# Patient Record
Sex: MALE | Race: BLACK OR AFRICAN AMERICAN | NOT HISPANIC OR LATINO
[De-identification: names, ages, dates, MRNs, and addresses within clinical notes are randomized per-mention and may not be internally consistent; named-entity substitution may affect disease eponyms.]

---

## 2019-07-29 ENCOUNTER — RECORD ABSTRACTING (OUTPATIENT)
Age: 54
End: 2019-07-29

## 2019-07-30 ENCOUNTER — APPOINTMENT (OUTPATIENT)
Dept: CARDIOLOGY | Facility: CLINIC | Age: 54
End: 2019-07-30

## 2019-07-30 ENCOUNTER — APPOINTMENT (OUTPATIENT)
Dept: CARDIOLOGY | Facility: CLINIC | Age: 54
End: 2019-07-30
Payer: COMMERCIAL

## 2019-07-30 ENCOUNTER — NON-APPOINTMENT (OUTPATIENT)
Age: 54
End: 2019-07-30

## 2019-07-30 VITALS
BODY MASS INDEX: 37.38 KG/M2 | HEART RATE: 93 BPM | DIASTOLIC BLOOD PRESSURE: 88 MMHG | WEIGHT: 276 LBS | RESPIRATION RATE: 16 BRPM | HEIGHT: 72 IN | SYSTOLIC BLOOD PRESSURE: 140 MMHG

## 2019-07-30 VITALS — SYSTOLIC BLOOD PRESSURE: 128 MMHG | DIASTOLIC BLOOD PRESSURE: 80 MMHG

## 2019-07-30 PROCEDURE — 93000 ELECTROCARDIOGRAM COMPLETE: CPT

## 2019-07-30 PROCEDURE — 99214 OFFICE O/P EST MOD 30 MIN: CPT

## 2019-07-30 RX ORDER — AMLODIPINE BESYLATE AND BENAZEPRIL HYDROCHLORIDE 5; 10 MG/1; MG/1
5-10 CAPSULE ORAL
Refills: 0 | Status: DISCONTINUED | COMMUNITY
End: 2019-07-30

## 2019-07-30 NOTE — DISCUSSION/SUMMARY
[FreeTextEntry1] : Patient is a 54yo M with HTN, obesity, mild MR here for cardiac follow up. Patient has been doing well without any chest pain or shortness of breath.  Given ASCVD 8.7%, will arrange EST prior to more regular exercise and echo to re-evaluate LVH/MR. \par \par 1. Echo to eval LVH and MR seen on prior\par 2. EST prior to more regular exercise and to evaluate CAD\par 3. Recommend aggressive diet and lifestyle modifications, counselled on weight loss\par 4. Will discuss regular exercise program at follow up as long as testing unremarkable \par 5. Follow up after testing\par 6. Continue current antihypertensives, blood pressure is well controlled \par 7. Consider statin given ASCVD

## 2019-07-30 NOTE — ASSESSMENT
[FreeTextEntry1] : ECG: SR, iRBBB, NSST\par \par  HDL 42 LDL 87 TG 62 (6/2019) \par \par EST 2017:\par 1. Negative exercise stress test for ischemia.\par 2. The patient developed occasional PVCs during exercise.\par 3. The blood pressure response was normal.\par 4. The patient developed knee pain during the stress exam. The symptoms resolved with rest.\par 5. The test was terminated due to fatigue and knee pain.\par 6. The patient's functional capacity was average.\par 7. The Duke Treadmill Score was 8, consistent with low risk.\par \par ECHO 2017:\par 1. Left ventricular ejection fraction, by visual estimation, is 60 to 65%.\par 2. Normal global left ventricular systolic function.\par 3. Normal left ventricular internal cavity size.\par 4. Mild concentric left ventricular hypertrophy.\par 5. Normal right ventricular size and systolic function.\par 6. The left atrium is normal in size and structure.\par 7. The right atrium is normal in size and structure.\par 8. Mild thickening of the anterior and posterior mitral valve leaflets.\par 9. Mild mitral valve regurgitation.\par 10. Normal aortic valve, without any evidence of aortic stenosis or insufficiency.\par 11. Trace pulmonic valve regurgitation.\par 12. There is no evidence of pericardial effusion.\par 13. In comparison to prior studies there is no significant interval change.\par 14. TR jet was inadequate for evaluation of RV/PA pressure.\par 15. Recommend clinical correlation with the above findings.

## 2019-07-30 NOTE — HISTORY OF PRESENT ILLNESS
[FreeTextEntry1] : Patient is a 52yo M with HTN, obesity, mild MR here for cardiac follow up. Last seen in 2017. Patient has been doing well without any chest pain or shortness of breath. Patient denies PND/orthopnea/edema/palpitations/syncope/claudication. 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 \par NO regular exercise but active at work. \par \par ROS: GI and  negative

## 2019-07-30 NOTE — PHYSICAL EXAM
[General Appearance - Well Developed] : well developed [General Appearance - Well Nourished] : well nourished [Normal Conjunctiva] : the conjunctiva exhibited no abnormalities [Normal Jugular Venous V Waves Present] : normal jugular venous V waves present [Normal Oropharynx] : normal oropharynx [] : no respiratory distress [Respiration, Rhythm And Depth] : normal respiratory rhythm and effort [Auscultation Breath Sounds / Voice Sounds] : lungs were clear to auscultation bilaterally [Heart Rate And Rhythm] : heart rate and rhythm were normal [Heart Sounds] : normal S1 and S2 [Murmurs] : no murmurs present [Bowel Sounds] : normal bowel sounds [Abdomen Tenderness] : non-tender [Abdomen Soft] : soft [Abnormal Walk] : normal gait [Nail Clubbing] : no clubbing of the fingernails [Cyanosis, Localized] : no localized cyanosis [Skin Color & Pigmentation] : normal skin color and pigmentation [Oriented To Time, Place, And Person] : oriented to person, place, and time [Affect] : the affect was normal [FreeTextEntry1] : no edema

## 2019-09-17 ENCOUNTER — APPOINTMENT (OUTPATIENT)
Dept: CARDIOLOGY | Facility: CLINIC | Age: 54
End: 2019-09-17

## 2019-10-14 ENCOUNTER — APPOINTMENT (OUTPATIENT)
Dept: CARDIOLOGY | Facility: CLINIC | Age: 54
End: 2019-10-14

## 2019-11-11 ENCOUNTER — APPOINTMENT (OUTPATIENT)
Dept: CARDIOLOGY | Facility: CLINIC | Age: 54
End: 2019-11-11

## 2020-01-06 ENCOUNTER — APPOINTMENT (OUTPATIENT)
Dept: CARDIOLOGY | Facility: CLINIC | Age: 55
End: 2020-01-06

## 2020-05-15 ENCOUNTER — TRANSCRIPTION ENCOUNTER (OUTPATIENT)
Age: 55
End: 2020-05-15

## 2021-04-29 ENCOUNTER — APPOINTMENT (OUTPATIENT)
Dept: CARDIOLOGY | Facility: CLINIC | Age: 56
End: 2021-04-29

## 2021-10-13 ENCOUNTER — NON-APPOINTMENT (OUTPATIENT)
Age: 56
End: 2021-10-13

## 2021-10-13 ENCOUNTER — APPOINTMENT (OUTPATIENT)
Dept: CARDIOLOGY | Facility: CLINIC | Age: 56
End: 2021-10-13
Payer: COMMERCIAL

## 2021-10-13 VITALS
OXYGEN SATURATION: 98 % | HEART RATE: 83 BPM | RESPIRATION RATE: 16 BRPM | SYSTOLIC BLOOD PRESSURE: 142 MMHG | BODY MASS INDEX: 39.01 KG/M2 | HEIGHT: 72 IN | DIASTOLIC BLOOD PRESSURE: 88 MMHG | WEIGHT: 288 LBS

## 2021-10-13 DIAGNOSIS — R60.0 LOCALIZED EDEMA: ICD-10-CM

## 2021-10-13 PROCEDURE — 93000 ELECTROCARDIOGRAM COMPLETE: CPT

## 2021-10-13 PROCEDURE — 99214 OFFICE O/P EST MOD 30 MIN: CPT

## 2021-10-13 NOTE — HISTORY OF PRESENT ILLNESS
[FreeTextEntry1] : Patient is a 56yo M with HTN, obesity, mild MR here for cardiac follow up. Gets some intermittent chest pain. Feels sharp, will resolve fairly quickly. Sometimes feels pain in neck as well. Not exercising as much lately. Gets some dyspnea with activity. Getting sob sooner then he used to. At night when relaxed he will feel some palpitations. Not every day. 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 \par \par \par ROS: GI and  negative

## 2021-10-13 NOTE — DISCUSSION/SUMMARY
[FreeTextEntry1] : Patient is a 54yo M with HTN, obesity, mild MR here for cardiac follow up. \par -Having increased dyspnea lately, also atypical chest pain. Needs cardiac eval/work up\par -BP high but apparently normal at PMD\par -Needs recent BW to review\par -ECG with mild abnormalities, iRBBB/NSST/LAE\par -Mild edema as well, possibly weight/dependent\par \par 1. Echo and 2 day nuclear stress testing to evaluate CP/SOB/abnormal ECG \par 2. Continue to monitor BP, no med changes\par 3. Obtain recent blood work to review\par 4. Venous duplex to eval edema\par 5. Recommend aggressive diet and lifestyle modifications \par 6. Follow up after testing \par 7. Carotid from 2020 at [facility] with minimal plaque

## 2021-10-13 NOTE — ASSESSMENT
[FreeTextEntry1] : ECG: SR, iRBBB, NSST, LAE \par \par  HDL 42 LDL 87 TG 62 (6/2019) \par \par EST 2017:\par 1. Negative exercise stress test for ischemia.\par 2. The patient developed occasional PVCs during exercise.\par 3. The blood pressure response was normal.\par 4. The patient developed knee pain during the stress exam. The symptoms resolved with rest.\par 5. The test was terminated due to fatigue and knee pain.\par 6. The patient's functional capacity was average.\par 7. The Duke Treadmill Score was 8, consistent with low risk.\par \par ECHO 2017:\par 1. Left ventricular ejection fraction, by visual estimation, is 60 to 65%.\par 2. Normal global left ventricular systolic function.\par 3. Normal left ventricular internal cavity size.\par 4. Mild concentric left ventricular hypertrophy.\par 5. Normal RV/LA/RA \par 6. Mild thickening of the anterior and posterior mitral valve leaflets.\par 7. Mild mitral valve regurgitation.\par

## 2021-10-13 NOTE — PHYSICAL EXAM
[General Appearance - Well Developed] : well developed [General Appearance - Well Nourished] : well nourished [Normal Conjunctiva] : the conjunctiva exhibited no abnormalities [Normal Oropharynx] : normal oropharynx [Normal Jugular Venous V Waves Present] : normal jugular venous V waves present [] : no respiratory distress [Respiration, Rhythm And Depth] : normal respiratory rhythm and effort [Auscultation Breath Sounds / Voice Sounds] : lungs were clear to auscultation bilaterally [Heart Rate And Rhythm] : heart rate and rhythm were normal [Heart Sounds] : normal S1 and S2 [Murmurs] : no murmurs present [Bowel Sounds] : normal bowel sounds [Abdomen Soft] : soft [Abdomen Tenderness] : non-tender [Abnormal Walk] : normal gait [Nail Clubbing] : no clubbing of the fingernails [Cyanosis, Localized] : no localized cyanosis [Skin Color & Pigmentation] : normal skin color and pigmentation [Oriented To Time, Place, And Person] : oriented to person, place, and time [Affect] : the affect was normal [FreeTextEntry1] : 1+ pitting edema LE bilaterally

## 2021-10-27 ENCOUNTER — APPOINTMENT (OUTPATIENT)
Dept: CARDIOLOGY | Facility: CLINIC | Age: 56
End: 2021-10-27
Payer: COMMERCIAL

## 2021-10-27 PROCEDURE — 93970 EXTREMITY STUDY: CPT

## 2021-10-27 PROCEDURE — 93306 TTE W/DOPPLER COMPLETE: CPT

## 2021-11-29 ENCOUNTER — APPOINTMENT (OUTPATIENT)
Dept: CARDIOLOGY | Facility: CLINIC | Age: 56
End: 2021-11-29
Payer: COMMERCIAL

## 2021-11-29 PROCEDURE — A9500: CPT

## 2021-11-29 PROCEDURE — 93015 CV STRESS TEST SUPVJ I&R: CPT

## 2021-11-29 PROCEDURE — 78452 HT MUSCLE IMAGE SPECT MULT: CPT

## 2021-11-29 RX ORDER — REGADENOSON 0.08 MG/ML
0.4 INJECTION, SOLUTION INTRAVENOUS
Qty: 4 | Refills: 0 | Status: COMPLETED | OUTPATIENT
Start: 2021-11-29

## 2021-11-29 RX ADMIN — REGADENOSON 0 MG/5ML: 0.08 INJECTION, SOLUTION INTRAVENOUS at 00:00

## 2021-11-30 ENCOUNTER — APPOINTMENT (OUTPATIENT)
Dept: CARDIOLOGY | Facility: CLINIC | Age: 56
End: 2021-11-30

## 2022-01-04 ENCOUNTER — NON-APPOINTMENT (OUTPATIENT)
Age: 57
End: 2022-01-04

## 2022-01-04 ENCOUNTER — APPOINTMENT (OUTPATIENT)
Dept: CARDIOLOGY | Facility: CLINIC | Age: 57
End: 2022-01-04
Payer: COMMERCIAL

## 2022-01-04 VITALS
HEART RATE: 78 BPM | SYSTOLIC BLOOD PRESSURE: 120 MMHG | OXYGEN SATURATION: 97 % | BODY MASS INDEX: 38.87 KG/M2 | RESPIRATION RATE: 16 BRPM | DIASTOLIC BLOOD PRESSURE: 81 MMHG | WEIGHT: 287 LBS | HEIGHT: 72 IN

## 2022-01-04 PROCEDURE — 99214 OFFICE O/P EST MOD 30 MIN: CPT

## 2022-01-04 PROCEDURE — 93000 ELECTROCARDIOGRAM COMPLETE: CPT

## 2022-01-18 RX ORDER — KIT FOR THE PREPARATION OF TECHNETIUM TC99M SESTAMIBI 1 MG/5ML
INJECTION, POWDER, LYOPHILIZED, FOR SOLUTION PARENTERAL
Refills: 0 | Status: COMPLETED | OUTPATIENT
Start: 2022-01-18

## 2022-01-18 RX ADMIN — KIT FOR THE PREPARATION OF TECHNETIUM TC99M SESTAMIBI 0: 1 INJECTION, POWDER, LYOPHILIZED, FOR SOLUTION PARENTERAL at 00:00

## 2022-02-09 NOTE — HISTORY OF PRESENT ILLNESS
[FreeTextEntry1] : Patient is a 56yo M with HTN, obesity, mild MR here for cardiac follow up. Gets some intermittent chest pain. Feels sharp, will resolve fairly quickly. Sometimes feels pain in neck as well. Not exercising as much lately. Gets some dyspnea with activity. Getting sob sooner then he used to. At night when relaxed he will feel some palpitations. Not every day. Patient underwent cardiac testing after last visit. CP has improved and has started exercising more. Feels better. 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 \par \par \par ROS: GI and  negative

## 2022-02-09 NOTE — DISCUSSION/SUMMARY
[FreeTextEntry1] : Patient is a 55yo M with HTN, obesity, mild MR here for cardiac follow up. \par -Having increased dyspnea lately, also atypical chest pain appear all noncardiac given test results\par -Nuclear stress test 12/2021 without ischemia\par -Echo with mild LVH, otherwise unremarkable\par -Needs weight loss, BP better and controlled today \par -venous duplex to eval edema negative for DVT 10/2021 \par \par \par 1. Recommend 30 minutes moderate intensity aerobic activity 5 days per week \par 2. Recommend aggressive diet and lifestyle modifications \par 3. Continue current antihypertensives, blood pressure is well controlled\par 4. Annual follow up, consider repeat echo then to eval chamber dimensions \par 5. Carotid from 2020 at [facility] with minimal plaque\par 6. CAC for further risk strat, call with results

## 2022-02-09 NOTE — ASSESSMENT
[FreeTextEntry1] : ECG: SR, iRBBB, NSST\par \par  HDL 42 LDL 87 TG 62 (6/2019) \par \par ECHO 11/2021:\par 1. Mild LVH, EF 60-65%\par 2. Grade I diastolic dysfunction\par 3. Normal RV/LA/RA \par 4. No clinically significant valvular disease \par \par PHARM  NUCLEAR STRESS TEST 12/2021:\par 1. Negative ischemia/infarct, EF 52%\par 2. Normal HR/BP response \par 3. Mild LVE \par \par EST 2017:\par 1. Negative exercise stress test for ischemia.\par 2. The patient developed occasional PVCs during exercise.\par 3. The blood pressure response was normal.\par 4. The patient developed knee pain during the stress exam. The symptoms resolved with rest.\par 5. The test was terminated due to fatigue and knee pain.\par 6. The patient's functional capacity was average.\par 7. The Duke Treadmill Score was 8, consistent with low risk.\par \par ECHO 2017:\par 1. Left ventricular ejection fraction, by visual estimation, is 60 to 65%.\par 2. Normal global left ventricular systolic function.\par 3. Normal left ventricular internal cavity size.\par 4. Mild concentric left ventricular hypertrophy.\par 5. Normal RV/LA/RA \par 6. Mild thickening of the anterior and posterior mitral valve leaflets.\par 7. Mild mitral valve regurgitation.\par

## 2022-02-10 ENCOUNTER — NON-APPOINTMENT (OUTPATIENT)
Age: 57
End: 2022-02-10

## 2022-11-03 ENCOUNTER — APPOINTMENT (OUTPATIENT)
Dept: CARDIOLOGY | Facility: CLINIC | Age: 57
End: 2022-11-03

## 2022-11-03 ENCOUNTER — NON-APPOINTMENT (OUTPATIENT)
Age: 57
End: 2022-11-03

## 2022-11-03 VITALS
DIASTOLIC BLOOD PRESSURE: 56 MMHG | RESPIRATION RATE: 14 BRPM | WEIGHT: 273 LBS | HEART RATE: 75 BPM | OXYGEN SATURATION: 98 % | SYSTOLIC BLOOD PRESSURE: 108 MMHG | BODY MASS INDEX: 36.98 KG/M2 | HEIGHT: 72 IN

## 2022-11-03 DIAGNOSIS — R94.31 ABNORMAL ELECTROCARDIOGRAM [ECG] [EKG]: ICD-10-CM

## 2022-11-03 DIAGNOSIS — R07.89 OTHER CHEST PAIN: ICD-10-CM

## 2022-11-03 DIAGNOSIS — R06.09 OTHER FORMS OF DYSPNEA: ICD-10-CM

## 2022-11-03 PROCEDURE — 99214 OFFICE O/P EST MOD 30 MIN: CPT | Mod: 25

## 2022-11-03 PROCEDURE — 93000 ELECTROCARDIOGRAM COMPLETE: CPT

## 2022-11-03 NOTE — ASSESSMENT
[FreeTextEntry1] : ECG: SR, iRBBB, NSST\par \par  HDL 42 LDL 87 TG 62 (6/2019) \par \par 2/9/2022: CAC = 1 in LAD. \par \par ECHO 11/2021:\par 1. Mild LVH, EF 60-65%\par 2. Grade I diastolic dysfunction\par 3. Normal RV/LA/RA \par 4. No clinically significant valvular disease \par \par PHARM  NUCLEAR STRESS TEST 12/2021:\par 1. Negative ischemia/infarct, EF 52%\par 2. Normal HR/BP response \par 3. Mild LVE \par \par EST 2017:\par 1. Negative exercise stress test for ischemia.\par 2. The patient developed occasional PVCs during exercise.\par 3. The blood pressure response was normal.\par 4. The patient developed knee pain during the stress exam. The symptoms resolved with rest.\par 5. The test was terminated due to fatigue and knee pain.\par 6. The patient's functional capacity was average.\par 7. The Duke Treadmill Score was 8, consistent with low risk.\par \par ECHO 2017:\par 1. Left ventricular ejection fraction, by visual estimation, is 60 to 65%.\par 2. Normal global left ventricular systolic function.\par 3. Normal left ventricular internal cavity size.\par 4. Mild concentric left ventricular hypertrophy.\par 5. Normal RV/LA/RA \par 6. Mild thickening of the anterior and posterior mitral valve leaflets.\par 7. Mild mitral valve regurgitation.\par

## 2022-11-03 NOTE — DISCUSSION/SUMMARY
[FreeTextEntry1] : Patient is a 56yo M with HTN, obesity, mild MR here for cardiac follow up. \par -BP well controlled \par -Nuclear stress test 12/2021 without ischemia, CAC = 1 1/2022\par -Echo with mild LVH 2021 , otherwise unremarkable\par -Needs weight loss, BP better and controlled today \par -venous duplex to eval edema negative for DVT 10/2021 \par -Carotid from 2020 at [facility] with minimal plaque\par \par 1. Recommend 30 minutes moderate intensity aerobic activity 5 days per week \par 2. Recommend aggressive diet and lifestyle modifications \par 3. Continue current antihypertensives, blood pressure is well controlled\par 4. Annual follow up, consider repeat echo then to eval chamber dimensions \par 5. CV stable at this time, follow up 6 months with echo to re-eval LVH/chamber dimensions now that BP better controlled\par 6. Regular PMD follow up

## 2022-11-03 NOTE — PHYSICAL EXAM
[General Appearance - Well Developed] : well developed [General Appearance - Well Nourished] : well nourished [Normal Conjunctiva] : the conjunctiva exhibited no abnormalities [Normal Oropharynx] : normal oropharynx [Normal Jugular Venous V Waves Present] : normal jugular venous V waves present [] : no respiratory distress [Respiration, Rhythm And Depth] : normal respiratory rhythm and effort [Auscultation Breath Sounds / Voice Sounds] : lungs were clear to auscultation bilaterally [Heart Rate And Rhythm] : heart rate and rhythm were normal [Heart Sounds] : normal S1 and S2 [Murmurs] : no murmurs present [Bowel Sounds] : normal bowel sounds [Abdomen Soft] : soft [Abdomen Tenderness] : non-tender [Abnormal Walk] : normal gait [Nail Clubbing] : no clubbing of the fingernails [Cyanosis, Localized] : no localized cyanosis [FreeTextEntry1] : 1+ pitting edema LE bilaterally [Skin Color & Pigmentation] : normal skin color and pigmentation [Oriented To Time, Place, And Person] : oriented to person, place, and time [Affect] : the affect was normal

## 2022-11-03 NOTE — HISTORY OF PRESENT ILLNESS
[FreeTextEntry1] : Patient is a 58yo M with HTN, obesity, mild MR here for cardiac follow up. Patient has been doing well without any chest pain or shortness of breath. Patient denies PND/orthopnea/edema/palpitations/syncope/claudication. BP at home had been running high but normal here. 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 \par \par \par ROS: GI and  negative

## 2022-11-03 NOTE — ASSESSMENT
[FreeTextEntry1] : ECG: SR, iRBBB, NSST (no change from prior)\par \par  HDL 42 LDL 87 TG 62 (6/2019) \par \par 2/9/2022: CAC = 1 in LAD. \par \par ECHO 11/2021:\par 1. Mild LVH, EF 60-65%\par 2. Grade I diastolic dysfunction\par 3. Normal RV/LA/RA \par 4. No clinically significant valvular disease \par \par PHARM  NUCLEAR STRESS TEST 12/2021:\par 1. Negative ischemia/infarct, EF 52%\par 2. Normal HR/BP response \par 3. Mild LVE \par \par EST 2017:\par 1. Negative exercise stress test for ischemia.\par 2. The patient developed occasional PVCs during exercise.\par 3. The blood pressure response was normal.\par 4. The patient developed knee pain during the stress exam. The symptoms resolved with rest.\par 5. The test was terminated due to fatigue and knee pain.\par 6. The patient's functional capacity was average.\par 7. The Duke Treadmill Score was 8, consistent with low risk.\par \par ECHO 2017:\par 1. Left ventricular ejection fraction, by visual estimation, is 60 to 65%.\par 2. Normal global left ventricular systolic function.\par 3. Normal left ventricular internal cavity size.\par 4. Mild concentric left ventricular hypertrophy.\par 5. Normal RV/LA/RA \par 6. Mild thickening of the anterior and posterior mitral valve leaflets.\par 7. Mild mitral valve regurgitation.\par

## 2022-11-03 NOTE — HISTORY OF PRESENT ILLNESS
[FreeTextEntry1] : Patient is a 56yo M with HTN, obesity, mild MR here for cardiac follow up. Patient has been doing well without any chest pain or shortness of breath. 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 \par \par \par ROS: GI and  negative

## 2023-02-06 ENCOUNTER — EMERGENCY (EMERGENCY)
Facility: HOSPITAL | Age: 58
LOS: 0 days | Discharge: HOME | End: 2023-02-06
Attending: EMERGENCY MEDICINE
Payer: COMMERCIAL

## 2023-02-06 VITALS
HEART RATE: 79 BPM | OXYGEN SATURATION: 99 % | RESPIRATION RATE: 18 BRPM | DIASTOLIC BLOOD PRESSURE: 87 MMHG | SYSTOLIC BLOOD PRESSURE: 131 MMHG | TEMPERATURE: 98 F

## 2023-02-06 VITALS — WEIGHT: 265 LBS | HEIGHT: 71 IN

## 2023-02-06 DIAGNOSIS — I45.10 UNSPECIFIED RIGHT BUNDLE-BRANCH BLOCK: ICD-10-CM

## 2023-02-06 DIAGNOSIS — R00.2 PALPITATIONS: ICD-10-CM

## 2023-02-06 DIAGNOSIS — R79.89 OTHER SPECIFIED ABNORMAL FINDINGS OF BLOOD CHEMISTRY: ICD-10-CM

## 2023-02-06 DIAGNOSIS — E04.1 NONTOXIC SINGLE THYROID NODULE: ICD-10-CM

## 2023-02-06 DIAGNOSIS — I10 ESSENTIAL (PRIMARY) HYPERTENSION: ICD-10-CM

## 2023-02-06 DIAGNOSIS — R94.6 ABNORMAL RESULTS OF THYROID FUNCTION STUDIES: ICD-10-CM

## 2023-02-06 DIAGNOSIS — R07.89 OTHER CHEST PAIN: ICD-10-CM

## 2023-02-06 LAB
ADD ON TEST-SPECIMEN IN LAB: SIGNIFICANT CHANGE UP
ALBUMIN SERPL ELPH-MCNC: 4 G/DL — SIGNIFICANT CHANGE UP (ref 3.3–5)
ALP SERPL-CCNC: 45 U/L — SIGNIFICANT CHANGE UP (ref 40–120)
ALT FLD-CCNC: 38 U/L — SIGNIFICANT CHANGE UP (ref 12–78)
ANION GAP SERPL CALC-SCNC: 5 MMOL/L — SIGNIFICANT CHANGE UP (ref 5–17)
APPEARANCE UR: CLEAR — SIGNIFICANT CHANGE UP
AST SERPL-CCNC: 39 U/L — HIGH (ref 15–37)
BACTERIA # UR AUTO: ABNORMAL
BASOPHILS # BLD AUTO: 0.04 K/UL — SIGNIFICANT CHANGE UP (ref 0–0.2)
BASOPHILS NFR BLD AUTO: 0.4 % — SIGNIFICANT CHANGE UP (ref 0–2)
BILIRUB SERPL-MCNC: 0.6 MG/DL — SIGNIFICANT CHANGE UP (ref 0.2–1.2)
BILIRUB UR-MCNC: NEGATIVE — SIGNIFICANT CHANGE UP
BUN SERPL-MCNC: 15 MG/DL — SIGNIFICANT CHANGE UP (ref 7–23)
CALCIUM SERPL-MCNC: 9.4 MG/DL — SIGNIFICANT CHANGE UP (ref 8.5–10.1)
CHLORIDE SERPL-SCNC: 104 MMOL/L — SIGNIFICANT CHANGE UP (ref 96–108)
CO2 SERPL-SCNC: 30 MMOL/L — SIGNIFICANT CHANGE UP (ref 22–31)
COLOR SPEC: YELLOW — SIGNIFICANT CHANGE UP
CREAT SERPL-MCNC: 1.42 MG/DL — HIGH (ref 0.5–1.3)
DIFF PNL FLD: ABNORMAL
EGFR: 58 ML/MIN/1.73M2 — LOW
EOSINOPHIL # BLD AUTO: 0.05 K/UL — SIGNIFICANT CHANGE UP (ref 0–0.5)
EOSINOPHIL NFR BLD AUTO: 0.5 % — SIGNIFICANT CHANGE UP (ref 0–6)
EPI CELLS # UR: NEGATIVE — SIGNIFICANT CHANGE UP
GLUCOSE SERPL-MCNC: 87 MG/DL — SIGNIFICANT CHANGE UP (ref 70–99)
GLUCOSE UR QL: NEGATIVE — SIGNIFICANT CHANGE UP
GRAN CASTS # UR COMP ASSIST: ABNORMAL /LPF
HCT VFR BLD CALC: 42 % — SIGNIFICANT CHANGE UP (ref 39–50)
HGB BLD-MCNC: 14.6 G/DL — SIGNIFICANT CHANGE UP (ref 13–17)
HYALINE CASTS # UR AUTO: ABNORMAL /LPF
IMM GRANULOCYTES NFR BLD AUTO: 0.3 % — SIGNIFICANT CHANGE UP (ref 0–0.9)
KETONES UR-MCNC: NEGATIVE — SIGNIFICANT CHANGE UP
LEUKOCYTE ESTERASE UR-ACNC: NEGATIVE — SIGNIFICANT CHANGE UP
LYMPHOCYTES # BLD AUTO: 1.61 K/UL — SIGNIFICANT CHANGE UP (ref 1–3.3)
LYMPHOCYTES # BLD AUTO: 15.8 % — SIGNIFICANT CHANGE UP (ref 13–44)
MCHC RBC-ENTMCNC: 32.4 PG — SIGNIFICANT CHANGE UP (ref 27–34)
MCHC RBC-ENTMCNC: 34.8 GM/DL — SIGNIFICANT CHANGE UP (ref 32–36)
MCV RBC AUTO: 93.3 FL — SIGNIFICANT CHANGE UP (ref 80–100)
MONOCYTES # BLD AUTO: 0.56 K/UL — SIGNIFICANT CHANGE UP (ref 0–0.9)
MONOCYTES NFR BLD AUTO: 5.5 % — SIGNIFICANT CHANGE UP (ref 2–14)
NEUTROPHILS # BLD AUTO: 7.88 K/UL — HIGH (ref 1.8–7.4)
NEUTROPHILS NFR BLD AUTO: 77.5 % — HIGH (ref 43–77)
NITRITE UR-MCNC: NEGATIVE — SIGNIFICANT CHANGE UP
PH UR: 5 — SIGNIFICANT CHANGE UP (ref 5–8)
PLATELET # BLD AUTO: 206 K/UL — SIGNIFICANT CHANGE UP (ref 150–400)
POTASSIUM SERPL-MCNC: 4 MMOL/L — SIGNIFICANT CHANGE UP (ref 3.5–5.3)
POTASSIUM SERPL-SCNC: 4 MMOL/L — SIGNIFICANT CHANGE UP (ref 3.5–5.3)
PROT SERPL-MCNC: 7.9 GM/DL — SIGNIFICANT CHANGE UP (ref 6–8.3)
PROT UR-MCNC: 100
RBC # BLD: 4.5 M/UL — SIGNIFICANT CHANGE UP (ref 4.2–5.8)
RBC # FLD: 12.4 % — SIGNIFICANT CHANGE UP (ref 10.3–14.5)
RBC CASTS # UR COMP ASSIST: NEGATIVE /HPF — SIGNIFICANT CHANGE UP (ref 0–4)
SODIUM SERPL-SCNC: 139 MMOL/L — SIGNIFICANT CHANGE UP (ref 135–145)
SP GR SPEC: 1.01 — SIGNIFICANT CHANGE UP (ref 1.01–1.02)
TROPONIN I, HIGH SENSITIVITY RESULT: 29.38 NG/L — SIGNIFICANT CHANGE UP
TSH SERPL-MCNC: 0.12 UU/ML — LOW (ref 0.34–4.82)
UROBILINOGEN FLD QL: NEGATIVE — SIGNIFICANT CHANGE UP
WBC # BLD: 10.17 K/UL — SIGNIFICANT CHANGE UP (ref 3.8–10.5)
WBC # FLD AUTO: 10.17 K/UL — SIGNIFICANT CHANGE UP (ref 3.8–10.5)
WBC UR QL: SIGNIFICANT CHANGE UP /HPF (ref 0–5)

## 2023-02-06 PROCEDURE — 99284 EMERGENCY DEPT VISIT MOD MDM: CPT

## 2023-02-06 PROCEDURE — 80053 COMPREHEN METABOLIC PANEL: CPT

## 2023-02-06 PROCEDURE — 71045 X-RAY EXAM CHEST 1 VIEW: CPT

## 2023-02-06 PROCEDURE — 84484 ASSAY OF TROPONIN QUANT: CPT

## 2023-02-06 PROCEDURE — 84443 ASSAY THYROID STIM HORMONE: CPT

## 2023-02-06 PROCEDURE — 93010 ELECTROCARDIOGRAM REPORT: CPT

## 2023-02-06 PROCEDURE — 99285 EMERGENCY DEPT VISIT HI MDM: CPT | Mod: 25

## 2023-02-06 PROCEDURE — 85025 COMPLETE CBC W/AUTO DIFF WBC: CPT

## 2023-02-06 PROCEDURE — 93005 ELECTROCARDIOGRAM TRACING: CPT

## 2023-02-06 PROCEDURE — 81001 URINALYSIS AUTO W/SCOPE: CPT

## 2023-02-06 PROCEDURE — 71045 X-RAY EXAM CHEST 1 VIEW: CPT | Mod: 26

## 2023-02-06 PROCEDURE — 36415 COLL VENOUS BLD VENIPUNCTURE: CPT

## 2023-02-06 NOTE — ED ADULT TRIAGE NOTE - CHIEF COMPLAINT QUOTE
Pt arrives to ED complaining of intermittent chest "tightness" for two weeks worsening this morning. Hx HTN.

## 2023-02-06 NOTE — ED STATDOCS - PATIENT PORTAL LINK FT
You can access the FollowMyHealth Patient Portal offered by [facility] by registering at the following website: [email or web address]. By joining [employer]’s FollowMyHealth portal, you will also be able to view your health information using other applications (apps) compatible with our system.

## 2023-02-06 NOTE — ED ADULT NURSE NOTE - NSIMPLEMENTINTERV_GEN_ALL_ED
Implemented All Universal Safety Interventions:  Fine to call system. Call bell, personal items and telephone within reach. Instruct patient to call for assistance. Room bathroom lighting operational. Non-slip footwear when patient is off stretcher. Physically safe environment: no spills, clutter or unnecessary equipment. Stretcher in lowest position, wheels locked, appropriate side rails in place.

## 2023-02-06 NOTE — ED STATDOCS - ATTENDING APP SHARED VISIT CONTRIBUTION OF CARE
I personally saw the patient with the RALPH, and completed the key components of the history and physical exam. I then discussed the management plan with the RALPH.

## 2023-02-06 NOTE — ED STATDOCS - PROGRESS NOTE DETAILS
signed Chaya Purcell PA-C Pt seen initially in intake by Dr. Li   57M PMH HTN c/o intermittent chest discomfort and palpitations for a few weeks. pt has appt with card Dr Tracy on 2/9. No hx oc CAD. EKG sinus rhythm 84 with incomplete RBBB. I spoke to Dr Tracy, if ER workup negative will DC pt and he will have office call pt to arrange for outpt Holter monitor.

## 2023-02-06 NOTE — ED STATDOCS - CARE PROVIDER_API CALL
Thang Tracy)  Cardiovascular Disease  [address] [place] [address]  Phone: [phone]  Fax: [phone]  Follow Up Time:

## 2023-02-06 NOTE — ED STATDOCS - CLINICAL SUMMARY MEDICAL DECISION MAKING FREE TEXT BOX
Pt with intermittent palpitations x2 weeks. Will check labs and compare EKG to old. If workup negative, pt to follow up with cardio at his appt in 3 days. Pt with intermittent palpitations x2 weeks. Will check labs and compare EKG to old. If workup negative, pt to follow up with cardio at his appt in 3 days.    signed Chaya Purcell PA-C   Pt with palpitations and chest discomfort. TSH low, pt notes he has had thyroid nodule workup years ago with endocrine including bx but was negative, not on thyroid meds. Slightly elevated creatinine. Otherwise labs NAD. Pt to f/u with his PMD and card on 2/9, has appts with both. pt spoke to card office will go there tomorrow for holter monitor. return precautions given. Pt feeling well at LA, agrees with DC and plan of care.

## 2023-02-06 NOTE — ED STATDOCS - OBJECTIVE STATEMENT
58 y/o male with a PMHx of HTN presents to the ED c/o intermittent chest tightness and palpitations x weeks. Pt had cardiac workup in November, reportedly normal. Pt has an appt with his cardiologist in 3 days. NKDA. No other complaints at this time. Cardio: Dr. Tracy.

## 2023-02-06 NOTE — ED STATDOCS - NS ED ATTENDING STATEMENT MOD
This was a shared visit with the RALPH. I reviewed and verified the documentation and independently performed the documented:

## 2023-02-06 NOTE — ED STATDOCS - NSFOLLOWUPINSTRUCTIONS_ED_ALL_ED_FT
FOLLOW UP WITH DR DINH ON THURSDAY AT YOUR APPOINTMENT. RETURN TO ER FOR ANY WORSENING SYMPTOMS OR NEW CONCERNS.     Heart Palpitations    WHAT YOU NEED TO KNOW:    Heart palpitations are feelings that your heart races, jumps, throbs, or flutters. You may feel extra beats, no beats for a short time, or skipped beats. You may have these feelings in your chest, throat, or neck. They may happen when you are sitting, standing, or lying. Heart palpitations may be frightening, but are usually not caused by a serious problem.     DISCHARGE INSTRUCTIONS:    Call 911 or have someone else call for any of the following:     You have any of the following signs of a heart attack:   Squeezing, pressure, or pain in your chest       and any of the following:   Discomfort or pain in your back, neck, jaw, stomach, or arm       Shortness of breath      Nausea or vomiting      Lightheadedness or a sudden cold sweat      You have any of the following signs of a stroke:   Numbness or drooping on one side of your face       Weakness in an arm or leg      Confusion or difficulty speaking      Dizziness, a severe headache, or vision loss      You faint or lose consciousness.     Return to the emergency department if:     Your palpitations happen more often or get more intense.         Contact your healthcare provider if:     You have new or worsening swelling in your feet or ankles.      You have questions or concerns about your condition or care.    Follow up with your healthcare provider as directed: You may need to follow up with a cardiologist. You may need tests to check for heart problems that cause palpitations. Write down your questions so you remember to ask them during your visits.     Keep a record: Write down when your palpitations start and stop, what you were doing when they started, and your symptoms. Keep track of what you ate or drank within a few hours of your palpitations. Include anything that seemed to help your symptoms, such as lying down or holding your breath. This record will help you and your healthcare provider learn what triggers your palpitations. Bring this record with you to your follow up visits.    Help prevent heart palpitations:     Manage stress and anxiety. Find ways to relax such as listening to music, meditating, or doing yoga. Exercise can also help decrease stress and anxiety. Talk to someone you trust about your stress or anxiety. You can also talk to a therapist.       Get plenty of sleep every night. Ask your healthcare provider how much sleep you need each night.       Do not drink caffeine or alcohol. Caffeine and alcohol can make your palpitations worse. Caffeine is found in soda, coffee, tea, chocolate, and drinks that increase your energy.       Do not smoke. Nicotine and other chemicals in cigarettes and cigars may damage your heart and blood vessels. Ask your healthcare provider for information if you currently smoke and need help to quit. E-cigarettes or smokeless tobacco still contain nicotine. Talk to your healthcare provider before you use these products.       Do not use illegal drugs. Talk to your healthcare provider if you use illegal drugs and want help to quit. FOLLOW UP WITH DR DINH AND YOUR PRIMARY DOCTOR ON THURSDAY AT YOUR APPOINTMENT. RETURN TO ER FOR ANY WORSENING SYMPTOMS OR NEW CONCERNS.     Heart Palpitations    WHAT YOU NEED TO KNOW:    Heart palpitations are feelings that your heart races, jumps, throbs, or flutters. You may feel extra beats, no beats for a short time, or skipped beats. You may have these feelings in your chest, throat, or neck. They may happen when you are sitting, standing, or lying. Heart palpitations may be frightening, but are usually not caused by a serious problem.     DISCHARGE INSTRUCTIONS:    Call 911 or have someone else call for any of the following:     You have any of the following signs of a heart attack:   Squeezing, pressure, or pain in your chest       and any of the following:   Discomfort or pain in your back, neck, jaw, stomach, or arm       Shortness of breath      Nausea or vomiting      Lightheadedness or a sudden cold sweat      You have any of the following signs of a stroke:   Numbness or drooping on one side of your face       Weakness in an arm or leg      Confusion or difficulty speaking      Dizziness, a severe headache, or vision loss      You faint or lose consciousness.     Return to the emergency department if:     Your palpitations happen more often or get more intense.         Contact your healthcare provider if:     You have new or worsening swelling in your feet or ankles.      You have questions or concerns about your condition or care.    Follow up with your healthcare provider as directed: You may need to follow up with a cardiologist. You may need tests to check for heart problems that cause palpitations. Write down your questions so you remember to ask them during your visits.     Keep a record: Write down when your palpitations start and stop, what you were doing when they started, and your symptoms. Keep track of what you ate or drank within a few hours of your palpitations. Include anything that seemed to help your symptoms, such as lying down or holding your breath. This record will help you and your healthcare provider learn what triggers your palpitations. Bring this record with you to your follow up visits.    Help prevent heart palpitations:     Manage stress and anxiety. Find ways to relax such as listening to music, meditating, or doing yoga. Exercise can also help decrease stress and anxiety. Talk to someone you trust about your stress or anxiety. You can also talk to a therapist.       Get plenty of sleep every night. Ask your healthcare provider how much sleep you need each night.       Do not drink caffeine or alcohol. Caffeine and alcohol can make your palpitations worse. Caffeine is found in soda, coffee, tea, chocolate, and drinks that increase your energy.       Do not smoke. Nicotine and other chemicals in cigarettes and cigars may damage your heart and blood vessels. Ask your healthcare provider for information if you currently smoke and need help to quit. E-cigarettes or smokeless tobacco still contain nicotine. Talk to your healthcare provider before you use these products.       Do not use illegal drugs. Talk to your healthcare provider if you use illegal drugs and want help to quit.

## 2023-02-07 ENCOUNTER — APPOINTMENT (OUTPATIENT)
Dept: CARDIOLOGY | Facility: CLINIC | Age: 58
End: 2023-02-07
Payer: COMMERCIAL

## 2023-02-07 PROCEDURE — ZZZZZ: CPT

## 2023-02-08 ENCOUNTER — TRANSCRIPTION ENCOUNTER (OUTPATIENT)
Age: 58
End: 2023-02-08

## 2023-02-09 ENCOUNTER — APPOINTMENT (OUTPATIENT)
Dept: CARDIOLOGY | Facility: CLINIC | Age: 58
End: 2023-02-09
Payer: COMMERCIAL

## 2023-02-09 VITALS
WEIGHT: 268 LBS | DIASTOLIC BLOOD PRESSURE: 80 MMHG | HEART RATE: 71 BPM | RESPIRATION RATE: 15 BRPM | SYSTOLIC BLOOD PRESSURE: 120 MMHG | HEIGHT: 72 IN | OXYGEN SATURATION: 98 % | BODY MASS INDEX: 36.3 KG/M2

## 2023-02-09 PROCEDURE — 99214 OFFICE O/P EST MOD 30 MIN: CPT | Mod: 25

## 2023-02-09 PROCEDURE — 93000 ELECTROCARDIOGRAM COMPLETE: CPT

## 2023-02-09 RX ORDER — TESTOSTERONE CYPIONATE 100 MG/ML
100 INJECTION, SOLUTION INTRAMUSCULAR
Refills: 0 | Status: ACTIVE | COMMUNITY

## 2023-02-09 NOTE — DISCUSSION/SUMMARY
[EKG obtained to assist in diagnosis and management of assessed problem(s)] : EKG obtained to assist in diagnosis and management of assessed problem(s) [FreeTextEntry1] : Patient is a 58yo M with HTN, obesity, mild MR here for cardiac follow up and eval palps/CP. \par \par -Holter with unifocal PVCs\par -TSH mildly low in ED recently (0.12), scheduled to have repeated and sonogram of thyroid nodule\par -symptoms could be from hyperthyroidism although TSH only minimally low. May need endocrine eval\par -Will start low dose beta blocker, arrange echo and follow up 1 month\par \par -Nuclear stress test 12/2021 without ischemia, CAC = 1 1/2022\par -Echo with mild LVH 2021 , otherwise unremarkable\par -Needs weight loss, BP better and controlled today \par -venous duplex to eval edema negative for DVT 10/2021 \par -Carotid from 2020 at [facility] with minimal plaque\par \par 1. Metoprolol ER 25mg daily, echo and follow up 1 month\par 2. Evaluation of hyperthyroid per primary, may need endocrine eval

## 2023-02-09 NOTE — HISTORY OF PRESENT ILLNESS
[FreeTextEntry1] : Patient is a 58yo M with HTN, obesity, mild MR here for cardiac follow up of recent hospitalization for CP/palps. ECG and trops/BW negative. CAme in for holter and now follow up.  \par \par Starting beginning of this year feeling unwell. FElt more anxious and not himself.  Noted palps and racing intermittently. Would feel like its skipping a beat and irregular. Started feeling some heaviness in chest as well, not exertional . No PND/orthopnea/syncope/edema. 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 \par \par \par ROS: GI and  negative

## 2023-02-09 NOTE — ASSESSMENT
[FreeTextEntry1] : \par \par  HDL 42 LDL 87 TG 62 (6/2019) \par \par 2/9/2022: CAC = 1 in LAD. \par \par HOLTER 2/2023:\par 1. SR\par 2. Moderate PVCs, unifocal, 30 per hour\par \par ECHO 11/2021:\par 1. Mild LVH, EF 60-65%\par 2. Grade I diastolic dysfunction\par 3. Normal RV/LA/RA \par 4. No clinically significant valvular disease \par \par PHARM  NUCLEAR STRESS TEST 12/2021:\par 1. Negative ischemia/infarct, EF 52%\par 2. Normal HR/BP response \par 3. Mild LVE \par \par EST 2017:\par 1. Negative exercise stress test for ischemia.\par 2. The patient developed occasional PVCs during exercise.\par 3. The blood pressure response was normal.\par 4. The patient developed knee pain during the stress exam. The symptoms resolved with rest.\par 5. The test was terminated due to fatigue and knee pain.\par 6. The patient's functional capacity was average.\par 7. The Duke Treadmill Score was 8, consistent with low risk.\par \par ECHO 2017:\par 1. Left ventricular ejection fraction, by visual estimation, is 60 to 65%.\par 2. Normal global left ventricular systolic function.\par 3. Normal left ventricular internal cavity size.\par 4. Mild concentric left ventricular hypertrophy.\par 5. Normal RV/LA/RA \par 6. Mild thickening of the anterior and posterior mitral valve leaflets.\par 7. Mild mitral valve regurgitation.\par

## 2023-02-10 PROBLEM — I10 ESSENTIAL (PRIMARY) HYPERTENSION: Chronic | Status: ACTIVE | Noted: 2023-02-06

## 2023-02-22 ENCOUNTER — NON-APPOINTMENT (OUTPATIENT)
Age: 58
End: 2023-02-22

## 2023-02-23 ENCOUNTER — APPOINTMENT (OUTPATIENT)
Dept: CARDIOLOGY | Facility: CLINIC | Age: 58
End: 2023-02-23
Payer: COMMERCIAL

## 2023-02-23 PROCEDURE — 93306 TTE W/DOPPLER COMPLETE: CPT

## 2023-03-08 ENCOUNTER — APPOINTMENT (OUTPATIENT)
Dept: CARDIOLOGY | Facility: CLINIC | Age: 58
End: 2023-03-08
Payer: COMMERCIAL

## 2023-03-08 VITALS
WEIGHT: 258 LBS | BODY MASS INDEX: 34.95 KG/M2 | DIASTOLIC BLOOD PRESSURE: 76 MMHG | SYSTOLIC BLOOD PRESSURE: 122 MMHG | HEART RATE: 75 BPM | OXYGEN SATURATION: 97 % | HEIGHT: 72 IN | RESPIRATION RATE: 15 BRPM

## 2023-03-08 PROCEDURE — 99214 OFFICE O/P EST MOD 30 MIN: CPT

## 2023-03-08 NOTE — HISTORY OF PRESENT ILLNESS
[FreeTextEntry1] : Patient is a 58yo M with HTN, obesity, mild MR here for cardiac follow up of recent hospitalization for CP/palps. ECG and trops/BW negative. CAme in for holter and now follow up.  \par \par Starting beginning of this year feeling unwell. FElt more anxious and not himself.  Noted palps and racing intermittently. Would feel like its skipping a beat and irregular. Started feeling some heaviness in chest as well, not exertional . No PND/orthopnea/syncope/edema. \par \par Started on metoprolol, echo and holter done. Metoprolol increased to 50mg daily and feeling better with higher dose. HAs not seen endocrine. 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 \par \par \par ROS: GI and  negative

## 2023-03-08 NOTE — DISCUSSION/SUMMARY
[FreeTextEntry1] : Patient is a 56yo M with HTN, obesity, mild MR here for cardiac follow up and eval palps/CP. \par \par -Holter with unifocal PVCs 2/2023, 30 / hour, not high burden\par -TSH mildly low in ED recently (0.12), scheduled to have repeated and sonogram of thyroid nodule\par -symptoms could be from hyperthyroidism although TSH only minimally low. May need endocrine eval\par \par \par -Nuclear stress test 12/2021 without ischemia, CAC = 1 1/2022\par -Echo with mild LVH 2/2023 , otherwise unremarkable and no change from prior\par -Needs weight loss, BP better and controlled today \par -venous duplex to eval edema negative for DVT 10/2021 \par -Carotid from 2020 at [facility] with minimal plaque\par \par 1. Continue Metoprolol ER 50 mg daily\par 2. Evaluation of hyperthyroid per primary, may need endocrine eval\par 3. BP controlled , no med changes . Concerned about stopping an OTC supplement he thinks has helped BP, will stop and let me know if changes in BP at home. Do not expect any change\par 4. ASCVD 10.4%, but very low CAC and will hold off statin at this time\par 5. Follow up 6 months \par 6. PMD and endocrine follow up planned

## 2023-03-08 NOTE — ASSESSMENT
[FreeTextEntry1] : \par \par  HDL 36   (2021) \par  HDL 42 LDL 87 TG 62 (6/2019) \par \par 2/9/2022: CAC = 1 in LAD. \par \par ECHO 2/2023:\par 1. Mild LVH, EF 55-60%\par 2. Grade I diastolic dysfunction \par 3. Normal RV/LA/RA \par 4. Mild MR\par \par HOLTER 2/2023:\par 1. SR\par 2. Moderate PVCs, unifocal, 30 per hour\par \par ECHO 11/2021:\par 1. Mild LVH, EF 60-65%\par 2. Grade I diastolic dysfunction\par 3. Normal RV/LA/RA \par 4. No clinically significant valvular disease \par \par PHARM  NUCLEAR STRESS TEST 12/2021:\par 1. Negative ischemia/infarct, EF 52%\par 2. Normal HR/BP response \par 3. Mild LVE \par \par EST 2017:\par 1. Negative exercise stress test for ischemia.\par 2. The patient developed occasional PVCs during exercise.\par 3. The blood pressure response was normal.\par 4. The patient developed knee pain during the stress exam. The symptoms resolved with rest.\par 5. The test was terminated due to fatigue and knee pain.\par 6. The patient's functional capacity was average.\par 7. The Duke Treadmill Score was 8, consistent with low risk.\par \par ECHO 2017:\par 1. Left ventricular ejection fraction, by visual estimation, is 60 to 65%.\par 2. Normal global left ventricular systolic function.\par 3. Normal left ventricular internal cavity size.\par 4. Mild concentric left ventricular hypertrophy.\par 5. Normal RV/LA/RA \par 6. Mild thickening of the anterior and posterior mitral valve leaflets.\par 7. Mild mitral valve regurgitation.\par

## 2023-04-18 ENCOUNTER — APPOINTMENT (OUTPATIENT)
Dept: CARDIOLOGY | Facility: CLINIC | Age: 58
End: 2023-04-18

## 2023-08-07 RX ORDER — METOPROLOL SUCCINATE 50 MG/1
50 TABLET, EXTENDED RELEASE ORAL DAILY
Qty: 90 | Refills: 1 | Status: ACTIVE | COMMUNITY
Start: 2023-02-09 | End: 1900-01-01

## 2023-09-07 ENCOUNTER — APPOINTMENT (OUTPATIENT)
Dept: CARDIOLOGY | Facility: CLINIC | Age: 58
End: 2023-09-07
Payer: COMMERCIAL

## 2023-09-07 VITALS
BODY MASS INDEX: 31.42 KG/M2 | WEIGHT: 232 LBS | RESPIRATION RATE: 15 BRPM | HEIGHT: 72 IN | OXYGEN SATURATION: 98 % | HEART RATE: 75 BPM | DIASTOLIC BLOOD PRESSURE: 80 MMHG | SYSTOLIC BLOOD PRESSURE: 110 MMHG

## 2023-09-07 DIAGNOSIS — I34.0 NONRHEUMATIC MITRAL (VALVE) INSUFFICIENCY: ICD-10-CM

## 2023-09-07 PROCEDURE — 93000 ELECTROCARDIOGRAM COMPLETE: CPT

## 2023-09-07 PROCEDURE — 99214 OFFICE O/P EST MOD 30 MIN: CPT | Mod: 25

## 2023-09-07 RX ORDER — ESZOPICLONE 1 MG/1
1 TABLET, FILM COATED ORAL
Refills: 0 | Status: ACTIVE | COMMUNITY

## 2023-09-07 RX ORDER — METHIMAZOLE 5 MG/1
5 TABLET ORAL DAILY
Refills: 0 | Status: ACTIVE | COMMUNITY

## 2023-09-07 NOTE — DISCUSSION/SUMMARY
[FreeTextEntry1] : Patient is a 56yo M with HTN, obesity, mild MR here for cardiac follow up  -Holter with unifocal PVCs 2/2023, 30 / hour, low  burden  -Nuclear stress test 12/2021 without ischemia, CAC = 1 1/2022 -Echo with mild LVH 2/2023 , otherwise unremarkable and no change from prior -venous duplex to eval edema negative for DVT 10/2021  -Carotid from 2020 at [facility] with minimal plaque  --Follows with endocrine now for hyperthyroidism.  -CV stable at this time -STruggles with insomnia and brain fog   1. Continue Metoprolol ER 50 mg daily 2. Endocrine follow up  3. Continue current antihypertensives, blood pressure is well controlled  4. ASCVD 10.4%, but very low CAC and will hold off statin at this time 5. CV stable, continue aggressive risk factor modification  6. Takes OTC supplement Carditone, plans to stop. THinks BP has gone up with stopping in past. Will monitor at home and contact me if BP starts to increase. No clear reason it should effect his BP, will try QOD for 1 week then stop 7. Follow up 6 months  [EKG obtained to assist in diagnosis and management of assessed problem(s)] : EKG obtained to assist in diagnosis and management of assessed problem(s)

## 2023-09-07 NOTE — HISTORY OF PRESENT ILLNESS
[FreeTextEntry1] : Patient is a 58yo M with HTN, obesity, mild MR here for cardiac follow up of recent hospitalization for CP/palps. ECG and trops/BW negative. CAme in for holter and now follow up.    Starting beginning of 2023r feeling unwell. FElt more anxious and not himself.  Noted palps and racing intermittently.  Tolerating metoprolol well since last OV and helped his BP/palps. Noted to have new hyperthyroidism at that time as well. Seen by endocrine. Ultimately put on methimazole. Still not feeling well, struggling with insomnia. No CP/SOB. Patient denies PND/orthopnea/edema/palpitations/syncope/claudication. Still with some "brain fog". Has lost weight.   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    ROS: GI and  negative

## 2023-10-17 NOTE — ED ADULT NURSE NOTE - NSFALLRSKOUTCOME_ED_ALL_ED
Administered By (Optional): Minh Treatment Number (Optional): 3 How Many Mls Were Removed From The 40 Mg/Ml (5ml) Vial When Preparing The Injectable Solution?: 0 Medical Necessity Clause: This procedure was medically necessary because the lesions that were treated were: Kenalog Preparation: Kenalog Require Ndc Code?: No Ndc# For Kenalog Only: 0403-2560-84 Kenalog Type Of Vial: Multiple Dose Validate Note Data When Using Inventory: Yes Total Volume (Ccs): 0.5 Concentration Of Kenalog Solution Injected (Mg/Ml): 3.0 Consent: The risks of atrophy were reviewed with the patient. Detail Level: Detailed Show Inventory Tab: Hide Universal Safety Interventions

## 2023-12-01 ENCOUNTER — NON-APPOINTMENT (OUTPATIENT)
Age: 58
End: 2023-12-01

## 2024-01-08 RX ORDER — AMLODIPINE BESYLATE AND BENAZEPRIL HYDROCHLORIDE 10; 40 MG/1; MG/1
10-40 CAPSULE ORAL
Qty: 90 | Refills: 1 | Status: ACTIVE | COMMUNITY
Start: 1900-01-01 | End: 1900-01-01

## 2024-03-06 ENCOUNTER — OFFICE (OUTPATIENT)
Dept: URBAN - METROPOLITAN AREA CLINIC 109 | Facility: CLINIC | Age: 59
Setting detail: OPHTHALMOLOGY
End: 2024-03-06
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
## 2024-03-06 PROCEDURE — NO SHOW FE NO SHOW FEE: Performed by: OPHTHALMOLOGY

## 2024-03-19 ENCOUNTER — NON-APPOINTMENT (OUTPATIENT)
Age: 59
End: 2024-03-19

## 2024-03-19 ENCOUNTER — APPOINTMENT (OUTPATIENT)
Dept: CARDIOLOGY | Facility: CLINIC | Age: 59
End: 2024-03-19
Payer: COMMERCIAL

## 2024-03-19 VITALS
WEIGHT: 223 LBS | BODY MASS INDEX: 30.2 KG/M2 | RESPIRATION RATE: 16 BRPM | OXYGEN SATURATION: 98 % | HEART RATE: 76 BPM | SYSTOLIC BLOOD PRESSURE: 124 MMHG | DIASTOLIC BLOOD PRESSURE: 80 MMHG | HEIGHT: 72 IN

## 2024-03-19 DIAGNOSIS — I10 ESSENTIAL (PRIMARY) HYPERTENSION: ICD-10-CM

## 2024-03-19 DIAGNOSIS — E66.9 OBESITY, UNSPECIFIED: ICD-10-CM

## 2024-03-19 DIAGNOSIS — R00.2 PALPITATIONS: ICD-10-CM

## 2024-03-19 DIAGNOSIS — I49.3 VENTRICULAR PREMATURE DEPOLARIZATION: ICD-10-CM

## 2024-03-19 DIAGNOSIS — I77.9 DISORDER OF ARTERIES AND ARTERIOLES, UNSPECIFIED: ICD-10-CM

## 2024-03-19 PROCEDURE — 93000 ELECTROCARDIOGRAM COMPLETE: CPT

## 2024-03-19 PROCEDURE — 99214 OFFICE O/P EST MOD 30 MIN: CPT

## 2024-03-19 PROCEDURE — G2211 COMPLEX E/M VISIT ADD ON: CPT

## 2024-03-19 NOTE — DISCUSSION/SUMMARY
[EKG obtained to assist in diagnosis and management of assessed problem(s)] : EKG obtained to assist in diagnosis and management of assessed problem(s) [FreeTextEntry1] : Patient is a 57yo M with HTN, obesity, mild MR, PVCs here for cardiac follow up  -Holter with unifocal PVCs 2/2023, 30 / hour, low  burden. No symptoms now and no change mgmt. Palps under good control, had been worse when diagnosed with hyperthyroidism in 2023.  -WEight coming down, BP improved -Nuclear stress test 12/2021 without ischemia, CAC = 1 1/2022 -Echo with mild LVH 2/2023 , otherwise unremarkable and no change from prior -venous duplex to eval edema negative for DVT 10/2021  -Carotid from 2020 at [facility] with minimal plaque  --Follows with endocrine  for hyperthyroidism, patient states goal to wean off methimazole now -CV stable at this time and BP controlled, ECG unremarkable today. Weight down   1. CV stable, continue aggressive risk factor modification  2. Regular Endocrine follow up  3. Continue current antihypertensives, blood pressure is well controlled . Can take extra 1/2 tab of metoprolol (dose of 25mg) for palps/PVCs 4. ASCVD 10.4%, but very low CAC and will hold off statin at this time 5. Recommend 30 minutes moderate intensity aerobic activity 5 days per week  6. Follow up

## 2024-03-19 NOTE — PHYSICAL EXAM
[General Appearance - Well Developed] : well developed [General Appearance - Well Nourished] : well nourished [Normal Conjunctiva] : the conjunctiva exhibited no abnormalities [Normal Jugular Venous V Waves Present] : normal jugular venous V waves present [Normal Oropharynx] : normal oropharynx [] : no respiratory distress [Respiration, Rhythm And Depth] : normal respiratory rhythm and effort [Auscultation Breath Sounds / Voice Sounds] : lungs were clear to auscultation bilaterally [Heart Rate And Rhythm] : heart rate and rhythm were normal [Heart Sounds] : normal S1 and S2 [Murmurs] : no murmurs present [Bowel Sounds] : normal bowel sounds [Abdomen Soft] : soft [Abdomen Tenderness] : non-tender [Abnormal Walk] : normal gait [Nail Clubbing] : no clubbing of the fingernails [Cyanosis, Localized] : no localized cyanosis [Skin Color & Pigmentation] : normal skin color and pigmentation [Oriented To Time, Place, And Person] : oriented to person, place, and time [Affect] : the affect was normal [FreeTextEntry1] : 1+ pitting edema LE bilaterally

## 2024-03-19 NOTE — HISTORY OF PRESENT ILLNESS
[FreeTextEntry1] : Patient is a 59yo M with HTN, obesity, mild MR, PVCs  here for cardiac follow up   Starting beginning of 2023 feeling unwell. Felt more anxious and not himself.  Noted palps and racing intermittently.  HAs had continue dissues with "fogginess" and GIU upset/bloating. Following with GI now as well. Struggles with sleep, also being treated for hyperthyroidism.   Stable from cardiac standpoint. No CP/SOB. 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    ROS: GI positive as noted above and  negative

## 2024-03-19 NOTE — ASSESSMENT
[FreeTextEntry1] : ECG: SR, no significant ST-T abnormalities and normal intervals     HDL 36   (2021)   HDL 42 LDL 87 TG 62 (6/2019)   2/9/2022: CAC = 1 in LAD.   ECHO 2/2023: 1. Mild LVH, EF 55-60% 2. Grade I diastolic dysfunction  3. Normal RV/LA/RA  4. Mild MR  HOLTER 2/2023: 1. SR 2. Moderate PVCs, unifocal, 30 per hour  ECHO 11/2021: 1. Mild LVH, EF 60-65% 2. Grade I diastolic dysfunction 3. Normal RV/LA/RA  4. No clinically significant valvular disease   PHARM  NUCLEAR STRESS TEST 12/2021: 1. Negative ischemia/infarct, EF 52% 2. Normal HR/BP response  3. Mild LVE   EST 2017: 1. Negative exercise stress test for ischemia. 2. The patient developed occasional PVCs during exercise. 3. The blood pressure response was normal. 4. The patient developed knee pain during the stress exam. The symptoms resolved with rest. 5. The test was terminated due to fatigue and knee pain. 6. The patient's functional capacity was average. 7. The Duke Treadmill Score was 8, consistent with low risk.  ECHO 2017: 1. Left ventricular ejection fraction, by visual estimation, is 60 to 65%. 2. Normal global left ventricular systolic function. 3. Normal left ventricular internal cavity size. 4. Mild concentric left ventricular hypertrophy. 5. Normal RV/LA/RA  6. Mild thickening of the anterior and posterior mitral valve leaflets. 7. Mild mitral valve regurgitation.

## 2024-05-07 ENCOUNTER — OFFICE (OUTPATIENT)
Dept: URBAN - METROPOLITAN AREA CLINIC 29 | Facility: CLINIC | Age: 59
Setting detail: OPHTHALMOLOGY
End: 2024-05-07
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
## 2024-05-07 PROCEDURE — NO SHOW FE NO SHOW FEE: Performed by: OPHTHALMOLOGY

## 2024-08-20 ENCOUNTER — OFFICE (OUTPATIENT)
Dept: URBAN - METROPOLITAN AREA CLINIC 63 | Facility: CLINIC | Age: 59
Setting detail: OPHTHALMOLOGY
End: 2024-08-20
Payer: COMMERCIAL

## 2024-08-20 DIAGNOSIS — H35.033: ICD-10-CM

## 2024-08-20 DIAGNOSIS — H40.013: ICD-10-CM

## 2024-08-20 PROCEDURE — 92083 EXTENDED VISUAL FIELD XM: CPT | Performed by: OPHTHALMOLOGY

## 2024-08-20 PROCEDURE — 76514 ECHO EXAM OF EYE THICKNESS: CPT | Performed by: OPHTHALMOLOGY

## 2024-08-20 PROCEDURE — 92002 INTRM OPH EXAM NEW PATIENT: CPT | Performed by: OPHTHALMOLOGY

## 2024-08-20 PROCEDURE — 92133 CPTRZD OPH DX IMG PST SGM ON: CPT | Performed by: OPHTHALMOLOGY

## 2024-08-20 ASSESSMENT — CONFRONTATIONAL VISUAL FIELD TEST (CVF)
OS_FINDINGS: FULL
OD_FINDINGS: FULL

## 2025-05-07 ENCOUNTER — NON-APPOINTMENT (OUTPATIENT)
Age: 60
End: 2025-05-07

## 2025-05-08 ENCOUNTER — APPOINTMENT (OUTPATIENT)
Dept: CARDIOLOGY | Facility: CLINIC | Age: 60
End: 2025-05-08
Payer: COMMERCIAL

## 2025-05-08 ENCOUNTER — NON-APPOINTMENT (OUTPATIENT)
Age: 60
End: 2025-05-08

## 2025-05-08 VITALS — WEIGHT: 265 LBS | BODY MASS INDEX: 35.94 KG/M2

## 2025-05-08 VITALS
RESPIRATION RATE: 15 BRPM | HEART RATE: 74 BPM | SYSTOLIC BLOOD PRESSURE: 134 MMHG | DIASTOLIC BLOOD PRESSURE: 88 MMHG | BODY MASS INDEX: 35.94 KG/M2 | WEIGHT: 265 LBS

## 2025-05-08 DIAGNOSIS — I10 ESSENTIAL (PRIMARY) HYPERTENSION: ICD-10-CM

## 2025-05-08 DIAGNOSIS — I49.3 VENTRICULAR PREMATURE DEPOLARIZATION: ICD-10-CM

## 2025-05-08 DIAGNOSIS — I51.7 CARDIOMEGALY: ICD-10-CM

## 2025-05-08 DIAGNOSIS — I77.9 DISORDER OF ARTERIES AND ARTERIOLES, UNSPECIFIED: ICD-10-CM

## 2025-05-08 DIAGNOSIS — E66.9 OBESITY, UNSPECIFIED: ICD-10-CM

## 2025-05-08 DIAGNOSIS — I34.0 NONRHEUMATIC MITRAL (VALVE) INSUFFICIENCY: ICD-10-CM

## 2025-05-08 PROCEDURE — G2211 COMPLEX E/M VISIT ADD ON: CPT | Mod: NC

## 2025-05-08 PROCEDURE — 93000 ELECTROCARDIOGRAM COMPLETE: CPT

## 2025-05-08 PROCEDURE — 99214 OFFICE O/P EST MOD 30 MIN: CPT

## 2025-05-29 ENCOUNTER — RX RENEWAL (OUTPATIENT)
Age: 60
End: 2025-05-29

## 2025-06-26 RX ORDER — ESZOPICLONE 2 MG/1
1 TABLET, FILM COATED ORAL
Refills: 0 | DISCHARGE

## 2025-06-26 RX ORDER — TRAZODONE HCL 100 MG
0 TABLET ORAL
Refills: 0 | DISCHARGE

## 2025-06-26 RX ORDER — ALPRAZOLAM 0.5 MG
1 TABLET, EXTENDED RELEASE 24 HR ORAL
Refills: 0 | DISCHARGE

## 2025-06-26 RX ORDER — ESCITALOPRAM OXALATE 20 MG/1
2 TABLET ORAL
Refills: 0 | DISCHARGE

## 2025-06-26 RX ORDER — AMLODIPINE BESYLATE 10 MG/1
1 TABLET ORAL
Refills: 0 | DISCHARGE

## 2025-06-27 ENCOUNTER — OUTPATIENT (OUTPATIENT)
Dept: INPATIENT UNIT | Facility: HOSPITAL | Age: 60
LOS: 1 days | Discharge: HOME | End: 2025-06-27
Payer: COMMERCIAL

## 2025-06-27 VITALS
RESPIRATION RATE: 16 BRPM | SYSTOLIC BLOOD PRESSURE: 115 MMHG | OXYGEN SATURATION: 98 % | DIASTOLIC BLOOD PRESSURE: 76 MMHG | TEMPERATURE: 98 F | HEART RATE: 79 BPM

## 2025-06-27 VITALS
TEMPERATURE: 97 F | WEIGHT: 259.93 LBS | OXYGEN SATURATION: 99 % | HEIGHT: 72 IN | SYSTOLIC BLOOD PRESSURE: 108 MMHG | HEART RATE: 74 BPM | DIASTOLIC BLOOD PRESSURE: 84 MMHG | RESPIRATION RATE: 16 BRPM

## 2025-06-27 DIAGNOSIS — G47.00 INSOMNIA, UNSPECIFIED: ICD-10-CM

## 2025-06-27 DIAGNOSIS — M21.6X1 OTHER ACQUIRED DEFORMITIES OF RIGHT FOOT: ICD-10-CM

## 2025-06-27 DIAGNOSIS — E04.9 NONTOXIC GOITER, UNSPECIFIED: ICD-10-CM

## 2025-06-27 DIAGNOSIS — Z98.890 OTHER SPECIFIED POSTPROCEDURAL STATES: Chronic | ICD-10-CM

## 2025-06-27 DIAGNOSIS — M76.61 ACHILLES TENDINITIS, RIGHT LEG: ICD-10-CM

## 2025-06-27 DIAGNOSIS — Z79.890 HORMONE REPLACEMENT THERAPY: ICD-10-CM

## 2025-06-27 DIAGNOSIS — M77.31 CALCANEAL SPUR, RIGHT FOOT: ICD-10-CM

## 2025-06-27 DIAGNOSIS — E66.01 MORBID (SEVERE) OBESITY DUE TO EXCESS CALORIES: ICD-10-CM

## 2025-06-27 DIAGNOSIS — I10 ESSENTIAL (PRIMARY) HYPERTENSION: ICD-10-CM

## 2025-06-27 PROCEDURE — C1713: CPT

## 2025-06-27 PROCEDURE — 73610 X-RAY EXAM OF ANKLE: CPT | Mod: RT

## 2025-06-27 PROCEDURE — 73610 X-RAY EXAM OF ANKLE: CPT | Mod: 26,RT

## 2025-06-27 RX ORDER — HYDROMORPHONE/SOD CHLOR,ISO/PF 2 MG/10 ML
0.5 SYRINGE (ML) INJECTION
Refills: 0 | Status: DISCONTINUED | OUTPATIENT
Start: 2025-06-27 | End: 2025-06-27

## 2025-06-27 RX ORDER — METOPROLOL SUCCINATE 50 MG/1
1 TABLET, EXTENDED RELEASE ORAL
Refills: 0 | DISCHARGE

## 2025-06-27 RX ORDER — OXYCODONE HYDROCHLORIDE 30 MG/1
10 TABLET ORAL ONCE
Refills: 0 | Status: DISCONTINUED | OUTPATIENT
Start: 2025-06-27 | End: 2025-06-27

## 2025-06-27 RX ORDER — METHIMAZOLE 5 MG
1 TABLET ORAL
Refills: 0 | DISCHARGE

## 2025-06-27 RX ORDER — AMLODIPINE AND BENAZEPRIL HYDROCHLORIDE 5; 20 MG/1; MG/1
1 CAPSULE ORAL
Refills: 0 | DISCHARGE

## 2025-06-27 RX ORDER — OXYCODONE HYDROCHLORIDE 30 MG/1
5 TABLET ORAL ONCE
Refills: 0 | Status: DISCONTINUED | OUTPATIENT
Start: 2025-06-27 | End: 2025-06-27

## 2025-06-27 RX ORDER — HYDROMORPHONE/SOD CHLOR,ISO/PF 2 MG/10 ML
1 SYRINGE (ML) INJECTION
Refills: 0 | Status: DISCONTINUED | OUTPATIENT
Start: 2025-06-27 | End: 2025-06-27

## 2025-06-27 RX ORDER — SODIUM CHLORIDE 9 G/1000ML
1000 INJECTION, SOLUTION INTRAVENOUS
Refills: 0 | Status: DISCONTINUED | OUTPATIENT
Start: 2025-06-27 | End: 2025-06-27

## 2025-06-27 NOTE — ASU DISCHARGE PLAN (ADULT/PEDIATRIC) - ASU DC SPECIAL INSTRUCTIONSFT
Please f/u with Dr Dominguez within 1 week of discharge  Keep dressings clean, dry and intact  Non weight bearing to the RLE  RICE therapy to the RLE

## 2025-06-27 NOTE — ASU DISCHARGE PLAN (ADULT/PEDIATRIC) - FINANCIAL ASSISTANCE
[facility] provides services at a reduced cost to those who are determined to be eligible through [facility]’s financial assistance program. Information regarding [facility]’s financial assistance program can be found by going to [email or web address] or by calling [phone].

## 2025-06-27 NOTE — PROCEDURAL SAFETY CHECKLIST WITH OR WITHOUT SEDATION - NSSIDESITEMARKD_GEN_ALL_CORE
I will START or STAY ON the medications listed below when I get home from the hospital:    acetaminophen 325 mg oral tablet  -- 3 tab(s) by mouth every 6 hours, As Needed  -- Indication: For pain    ibuprofen 600 mg oral tablet  -- 1 tab(s) by mouth every 6 hours, As Needed  -- Indication: For pain    benzocaine 20% topical spray  -- 1 spray(s) on skin every 6 hours, As needed, for Perineal discomfort  -- Indication: For perineal discomfort
done

## 2025-06-27 NOTE — ASU PATIENT PROFILE, ADULT - NSICDXPASTSURGICALHX_GEN_ALL_CORE_FT
PAST SURGICAL HISTORY:  S/P revision of anterior cruciate ligament reconstruction of right knee using quadriceps tendon autograft

## 2025-08-14 ENCOUNTER — APPOINTMENT (OUTPATIENT)
Dept: CARDIOLOGY | Facility: CLINIC | Age: 60
End: 2025-08-14
Payer: COMMERCIAL

## 2025-08-14 PROCEDURE — 93306 TTE W/DOPPLER COMPLETE: CPT

## 2025-08-21 ENCOUNTER — APPOINTMENT (OUTPATIENT)
Dept: CARDIOLOGY | Facility: CLINIC | Age: 60
End: 2025-08-21
Payer: COMMERCIAL

## 2025-08-21 VITALS
DIASTOLIC BLOOD PRESSURE: 78 MMHG | HEIGHT: 72 IN | BODY MASS INDEX: 37.38 KG/M2 | HEART RATE: 85 BPM | SYSTOLIC BLOOD PRESSURE: 134 MMHG | WEIGHT: 276 LBS

## 2025-08-21 DIAGNOSIS — I51.7 CARDIOMEGALY: ICD-10-CM

## 2025-08-21 DIAGNOSIS — E66.9 OBESITY, UNSPECIFIED: ICD-10-CM

## 2025-08-21 DIAGNOSIS — I49.3 VENTRICULAR PREMATURE DEPOLARIZATION: ICD-10-CM

## 2025-08-21 DIAGNOSIS — I10 ESSENTIAL (PRIMARY) HYPERTENSION: ICD-10-CM

## 2025-08-21 PROCEDURE — 99214 OFFICE O/P EST MOD 30 MIN: CPT

## 2025-08-21 PROCEDURE — G2211 COMPLEX E/M VISIT ADD ON: CPT | Mod: NC

## 2025-08-21 PROCEDURE — 93000 ELECTROCARDIOGRAM COMPLETE: CPT

## 2025-09-18 ENCOUNTER — RX RENEWAL (OUTPATIENT)
Age: 60
End: 2025-09-18

## 2025-09-18 RX ORDER — TADALAFIL 5 MG/1
5 TABLET ORAL
Qty: 90 | Refills: 0 | Status: ACTIVE | COMMUNITY
Start: 2025-09-17 | End: 1900-01-01